# Patient Record
Sex: FEMALE | Race: WHITE | NOT HISPANIC OR LATINO
[De-identification: names, ages, dates, MRNs, and addresses within clinical notes are randomized per-mention and may not be internally consistent; named-entity substitution may affect disease eponyms.]

---

## 2018-03-19 ENCOUNTER — FORM ENCOUNTER (OUTPATIENT)
Age: 43
End: 2018-03-19

## 2019-03-01 ENCOUNTER — FORM ENCOUNTER (OUTPATIENT)
Age: 44
End: 2019-03-01

## 2019-05-23 ENCOUNTER — TRANSCRIPTION ENCOUNTER (OUTPATIENT)
Age: 44
End: 2019-05-23

## 2019-05-29 ENCOUNTER — FORM ENCOUNTER (OUTPATIENT)
Age: 44
End: 2019-05-29

## 2019-05-29 PROBLEM — Z00.00 ENCOUNTER FOR PREVENTIVE HEALTH EXAMINATION: Status: ACTIVE | Noted: 2019-05-29

## 2019-06-02 ENCOUNTER — FORM ENCOUNTER (OUTPATIENT)
Age: 44
End: 2019-06-02

## 2019-06-06 ENCOUNTER — FORM ENCOUNTER (OUTPATIENT)
Age: 44
End: 2019-06-06

## 2019-06-10 ENCOUNTER — APPOINTMENT (OUTPATIENT)
Dept: MRI IMAGING | Facility: CLINIC | Age: 44
End: 2019-06-10

## 2019-06-10 ENCOUNTER — APPOINTMENT (OUTPATIENT)
Dept: MRI IMAGING | Facility: CLINIC | Age: 44
End: 2019-06-10
Payer: COMMERCIAL

## 2019-06-10 ENCOUNTER — OUTPATIENT (OUTPATIENT)
Dept: OUTPATIENT SERVICES | Facility: HOSPITAL | Age: 44
LOS: 1 days | End: 2019-06-10

## 2019-06-10 PROCEDURE — 70543 MRI ORBT/FAC/NCK W/O &W/DYE: CPT | Mod: 26

## 2019-06-10 PROCEDURE — 70553 MRI BRAIN STEM W/O & W/DYE: CPT | Mod: 26

## 2019-06-11 ENCOUNTER — FORM ENCOUNTER (OUTPATIENT)
Age: 44
End: 2019-06-11

## 2019-06-26 ENCOUNTER — APPOINTMENT (OUTPATIENT)
Dept: OPHTHALMOLOGY | Facility: CLINIC | Age: 44
End: 2019-06-26
Payer: COMMERCIAL

## 2019-06-26 ENCOUNTER — NON-APPOINTMENT (OUTPATIENT)
Age: 44
End: 2019-06-26

## 2019-06-26 PROCEDURE — 92083 EXTENDED VISUAL FIELD XM: CPT

## 2019-06-26 PROCEDURE — 99204 OFFICE O/P NEW MOD 45 MIN: CPT

## 2019-06-27 ENCOUNTER — APPOINTMENT (OUTPATIENT)
Dept: HUMAN REPRODUCTION | Facility: CLINIC | Age: 44
End: 2019-06-27
Payer: COMMERCIAL

## 2019-06-27 PROCEDURE — 99205 OFFICE O/P NEW HI 60 MIN: CPT | Mod: 25

## 2019-06-27 PROCEDURE — 76817 TRANSVAGINAL US OBSTETRIC: CPT

## 2019-07-15 ENCOUNTER — OUTPATIENT (OUTPATIENT)
Dept: OUTPATIENT SERVICES | Facility: HOSPITAL | Age: 44
LOS: 1 days | End: 2019-07-15

## 2019-07-15 ENCOUNTER — APPOINTMENT (OUTPATIENT)
Dept: MRI IMAGING | Facility: CLINIC | Age: 44
End: 2019-07-15
Payer: COMMERCIAL

## 2019-07-15 PROCEDURE — 72156 MRI NECK SPINE W/O & W/DYE: CPT | Mod: 26

## 2019-08-20 ENCOUNTER — APPOINTMENT (OUTPATIENT)
Dept: HUMAN REPRODUCTION | Facility: CLINIC | Age: 44
End: 2019-08-20
Payer: COMMERCIAL

## 2019-08-20 PROCEDURE — 76830 TRANSVAGINAL US NON-OB: CPT

## 2019-08-20 PROCEDURE — 99213 OFFICE O/P EST LOW 20 MIN: CPT | Mod: 25

## 2019-08-20 PROCEDURE — 36415 COLL VENOUS BLD VENIPUNCTURE: CPT

## 2019-08-21 ENCOUNTER — TRANSCRIPTION ENCOUNTER (OUTPATIENT)
Age: 44
End: 2019-08-21

## 2019-08-28 ENCOUNTER — APPOINTMENT (OUTPATIENT)
Dept: GYNECOLOGIC ONCOLOGY | Facility: CLINIC | Age: 44
End: 2019-08-28
Payer: COMMERCIAL

## 2019-08-28 VITALS
HEIGHT: 66 IN | SYSTOLIC BLOOD PRESSURE: 116 MMHG | WEIGHT: 128 LBS | DIASTOLIC BLOOD PRESSURE: 72 MMHG | BODY MASS INDEX: 20.57 KG/M2

## 2019-08-28 DIAGNOSIS — Z83.3 FAMILY HISTORY OF DIABETES MELLITUS: ICD-10-CM

## 2019-08-28 DIAGNOSIS — Z82.49 FAMILY HISTORY OF ISCHEMIC HEART DISEASE AND OTHER DISEASES OF THE CIRCULATORY SYSTEM: ICD-10-CM

## 2019-08-28 DIAGNOSIS — Z86.39 PERSONAL HISTORY OF OTHER ENDOCRINE, NUTRITIONAL AND METABOLIC DISEASE: ICD-10-CM

## 2019-08-28 DIAGNOSIS — F19.90 OTHER PSYCHOACTIVE SUBSTANCE USE, UNSPECIFIED, UNCOMPLICATED: ICD-10-CM

## 2019-08-28 PROCEDURE — 99205 OFFICE O/P NEW HI 60 MIN: CPT

## 2019-08-28 RX ORDER — LEVOTHYROXINE SODIUM 137 UG/1
TABLET ORAL
Refills: 0 | Status: ACTIVE | COMMUNITY

## 2019-08-28 RX ORDER — PNV 24/IRON AA CHEL/FOLIC ACID 30 MG-975
30-0.975-2 TABLET ORAL
Refills: 0 | Status: ACTIVE | COMMUNITY

## 2019-08-28 NOTE — PHYSICAL EXAM
[Normal] : Recto-Vaginal Exam: Normal [Fully active, able to carry on all pre-disease performance without restriction] : Status 0 - Fully active, able to carry on all pre-disease performance without restriction [de-identified] : redundant skin noted posterior to the introitus. 2mm skin tag still present. Colposcopic evaluation of the vulva revelas no other abnormal lesions.

## 2019-08-28 NOTE — ASSESSMENT
[FreeTextEntry1] : I reviewed the pathology report in detail with the patient and her , with the aid of diagrams. \par Recommendations for the management of vulvar dysplasia reviewed in detail. Medical vs. surgical management can be considered. Medical management with topical imiquimod cream to the vulva for 12 weeks has been shown to result in regression of disease. Side effects of this treatment include pain and burning secondary to excoriation. Laser ablation of lesions is also an option. Surgical management would include wide local excision of the lesion. For higher grade lesions, wide local excision has the additional benefit of ruling out invasive disease. \par \par Complications of wide local excision include wound separation, bleeding, and infection. The need for re-excision in the case of positive margin also discussed. If untreated, the risk of progression to vulvar carcinoma explained. \par \par Patient to return Friday for local excision. \par \par Will discuss genetic counseling 2' to maternal hx of breast cancer age 40.

## 2019-08-28 NOTE — PAST MEDICAL HISTORY
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

## 2019-08-28 NOTE — HISTORY OF PRESENT ILLNESS
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

## 2019-08-28 NOTE — CHIEF COMPLAINT
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 \par \par Hx of HPV related abnormal PAP in her 20s managed with a LEEP, Paps since have been normal.\par \par OBHx: C/S x 1\par GYNHx: as above\par PMHx: Hypothyroid\par PSHx: rhiplasty, c/section, LEEP\par Meds: thyroxine\par Allergies: NDKA\par SocialHx:School psychologist, , ETOH 1x weekly, no other toxic habits\par FmHx: Breast cancer mother age 39.

## 2019-08-30 ENCOUNTER — APPOINTMENT (OUTPATIENT)
Dept: GYNECOLOGIC ONCOLOGY | Facility: CLINIC | Age: 44
End: 2019-08-30
Payer: COMMERCIAL

## 2019-08-30 PROCEDURE — 56606 BIOPSY OF VULVA/PERINEUM: CPT

## 2019-08-30 PROCEDURE — 56605 BIOPSY OF VULVA/PERINEUM: CPT

## 2019-08-30 PROCEDURE — 99213 OFFICE O/P EST LOW 20 MIN: CPT | Mod: 25

## 2019-08-30 NOTE — HISTORY OF PRESENT ILLNESS
[FreeTextEntry1] : Problem List\par 1) Perirectal Biopsy: HSIL (SHAIN 3) extending to one of the lateral margins of the specimen.

## 2019-08-30 NOTE — REASON FOR VISIT
[FreeTextEntry1] : The wide local excision procedure was discussed with the patient in detail. The risks, benefits, alternatives and indications for the procedure were reviewed in detail. The area was prepped with betadyne and injected with 10cc of 1% lidocaine. A 3mm raised lesion was appreciated at left aspect of the posterior perineum. The vulvar lesion was excised with the scalpel. An additional anterior and posterior margin was taken. The skin edges were reapproximated with 3-0 monocryl. Excellent hemostasis was appreciated at the conclusion of the procedure. The patient tolerated the procedure well. Post procedure instructions given.

## 2019-08-30 NOTE — ASSESSMENT
[FreeTextEntry1] : [] Follow up pathology result\par [] Return in 4 weeks\par [] Discuss genetic counseling at next visit.

## 2019-08-30 NOTE — PAST MEDICAL HISTORY
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

## 2019-08-30 NOTE — PHYSICAL EXAM
[Normal] : Recto-Vaginal Exam: Normal [Fully active, able to carry on all pre-disease performance without restriction] : Status 0 - Fully active, able to carry on all pre-disease performance without restriction [de-identified] : redundant skin noted posterior to the introitus. 2mm skin tag still present. Colposcopic evaluation of the vulva revelas no other abnormal lesions.

## 2019-09-19 ENCOUNTER — APPOINTMENT (OUTPATIENT)
Dept: HUMAN REPRODUCTION | Facility: CLINIC | Age: 44
End: 2019-09-19
Payer: COMMERCIAL

## 2019-09-19 PROCEDURE — 76830 TRANSVAGINAL US NON-OB: CPT

## 2019-09-19 PROCEDURE — 36415 COLL VENOUS BLD VENIPUNCTURE: CPT

## 2019-09-27 ENCOUNTER — APPOINTMENT (OUTPATIENT)
Dept: HUMAN REPRODUCTION | Facility: CLINIC | Age: 44
End: 2019-09-27
Payer: COMMERCIAL

## 2019-09-27 ENCOUNTER — APPOINTMENT (OUTPATIENT)
Dept: GYNECOLOGIC ONCOLOGY | Facility: CLINIC | Age: 44
End: 2019-09-27
Payer: COMMERCIAL

## 2019-09-27 PROCEDURE — 76830 TRANSVAGINAL US NON-OB: CPT

## 2019-09-27 PROCEDURE — 99213 OFFICE O/P EST LOW 20 MIN: CPT | Mod: 25

## 2019-09-27 PROCEDURE — 36415 COLL VENOUS BLD VENIPUNCTURE: CPT

## 2019-09-30 ENCOUNTER — APPOINTMENT (OUTPATIENT)
Dept: HUMAN REPRODUCTION | Facility: CLINIC | Age: 44
End: 2019-09-30
Payer: COMMERCIAL

## 2019-09-30 PROCEDURE — 99213 OFFICE O/P EST LOW 20 MIN: CPT | Mod: 25

## 2019-09-30 PROCEDURE — 76830 TRANSVAGINAL US NON-OB: CPT

## 2019-09-30 PROCEDURE — 36415 COLL VENOUS BLD VENIPUNCTURE: CPT

## 2019-10-01 ENCOUNTER — APPOINTMENT (OUTPATIENT)
Dept: HUMAN REPRODUCTION | Facility: CLINIC | Age: 44
End: 2019-10-01

## 2019-10-01 ENCOUNTER — APPOINTMENT (OUTPATIENT)
Dept: HUMAN REPRODUCTION | Facility: CLINIC | Age: 44
End: 2019-10-01
Payer: COMMERCIAL

## 2019-10-01 PROCEDURE — 58322 ARTIFICIAL INSEMINATION: CPT

## 2019-10-01 PROCEDURE — 99213 OFFICE O/P EST LOW 20 MIN: CPT | Mod: 25

## 2019-10-01 PROCEDURE — 76830 TRANSVAGINAL US NON-OB: CPT

## 2019-10-14 ENCOUNTER — APPOINTMENT (OUTPATIENT)
Dept: HUMAN REPRODUCTION | Facility: CLINIC | Age: 44
End: 2019-10-14
Payer: COMMERCIAL

## 2019-10-14 PROCEDURE — 36415 COLL VENOUS BLD VENIPUNCTURE: CPT

## 2019-10-18 ENCOUNTER — APPOINTMENT (OUTPATIENT)
Dept: HUMAN REPRODUCTION | Facility: CLINIC | Age: 44
End: 2019-10-18
Payer: COMMERCIAL

## 2019-10-18 ENCOUNTER — APPOINTMENT (OUTPATIENT)
Dept: GYNECOLOGIC ONCOLOGY | Facility: CLINIC | Age: 44
End: 2019-10-18
Payer: COMMERCIAL

## 2019-10-18 VITALS
SYSTOLIC BLOOD PRESSURE: 118 MMHG | WEIGHT: 130 LBS | DIASTOLIC BLOOD PRESSURE: 64 MMHG | BODY MASS INDEX: 20.89 KG/M2 | HEIGHT: 66 IN

## 2019-10-18 PROCEDURE — 99213 OFFICE O/P EST LOW 20 MIN: CPT

## 2019-10-18 PROCEDURE — 36415 COLL VENOUS BLD VENIPUNCTURE: CPT

## 2019-10-18 PROCEDURE — 99213 OFFICE O/P EST LOW 20 MIN: CPT | Mod: 25

## 2019-10-18 PROCEDURE — 76830 TRANSVAGINAL US NON-OB: CPT

## 2019-10-18 NOTE — ASSESSMENT
[FreeTextEntry1] : Excision site well healed. No evidence of dysplasia on specimen.\par \par Importance of close follow up emphasized. Location of dysplasia also discussed as area is very close to the rectum and difficult to assess. \par \par Reconsult as needed.

## 2019-10-18 NOTE — HISTORY OF PRESENT ILLNESS
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 \par 2) Wide excision biopsy of vulvar : 8/30/2019 \par     a] vulvar lesion: polypoid fragment of squamous mucosa w/ hyperkeratosis\par     b] Posterior margin: Unremarkable squamous mucosa negative for dysplasia\par     c] Anterior margin: Unremarkable squamous mucosa negative for dysplasia

## 2019-10-18 NOTE — REASON FOR VISIT
[FreeTextEntry1] : Pt here for 4 week f/u of wide local excision for SHANI III. \par \par Discussed family hx of breast cancer in more detail today. Patient reports she had genetic testing and it was negative. \par \par OBHx: C/S x 1\par GYNHx: as above\par PMHx: Hypothyroid\par PSHx: rhiplasty, c/section, LEEP\par Meds: thyroxine\par Allergies: NDKA\par SocialHx:School psychologist, , ETOH 1x weekly, no other toxic habits\par FmHx: Breast cancer mother age 39.

## 2019-10-18 NOTE — PAST MEDICAL HISTORY
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  [FreeTextEntry3] : duration  4-6 days

## 2019-10-25 ENCOUNTER — APPOINTMENT (OUTPATIENT)
Dept: HUMAN REPRODUCTION | Facility: CLINIC | Age: 44
End: 2019-10-25
Payer: COMMERCIAL

## 2019-10-25 PROCEDURE — 99213 OFFICE O/P EST LOW 20 MIN: CPT | Mod: 25

## 2019-10-25 PROCEDURE — 36415 COLL VENOUS BLD VENIPUNCTURE: CPT

## 2019-10-25 PROCEDURE — 76830 TRANSVAGINAL US NON-OB: CPT

## 2019-10-28 ENCOUNTER — APPOINTMENT (OUTPATIENT)
Dept: HUMAN REPRODUCTION | Facility: CLINIC | Age: 44
End: 2019-10-28
Payer: COMMERCIAL

## 2019-10-28 PROCEDURE — 76830 TRANSVAGINAL US NON-OB: CPT

## 2019-10-28 PROCEDURE — 99213 OFFICE O/P EST LOW 20 MIN: CPT | Mod: 25

## 2019-10-30 ENCOUNTER — APPOINTMENT (OUTPATIENT)
Dept: HUMAN REPRODUCTION | Facility: CLINIC | Age: 44
End: 2019-10-30
Payer: COMMERCIAL

## 2019-10-30 PROCEDURE — 99213 OFFICE O/P EST LOW 20 MIN: CPT | Mod: 25

## 2019-10-30 PROCEDURE — 58322 ARTIFICIAL INSEMINATION: CPT

## 2019-10-30 PROCEDURE — 76830 TRANSVAGINAL US NON-OB: CPT

## 2019-10-30 PROCEDURE — 89261 SPERM ISOLATION COMPLEX: CPT

## 2019-11-11 ENCOUNTER — APPOINTMENT (OUTPATIENT)
Dept: HUMAN REPRODUCTION | Facility: CLINIC | Age: 44
End: 2019-11-11
Payer: COMMERCIAL

## 2019-11-11 PROCEDURE — 36415 COLL VENOUS BLD VENIPUNCTURE: CPT

## 2019-11-13 ENCOUNTER — APPOINTMENT (OUTPATIENT)
Dept: HUMAN REPRODUCTION | Facility: CLINIC | Age: 44
End: 2019-11-13
Payer: COMMERCIAL

## 2019-11-13 PROCEDURE — 36415 COLL VENOUS BLD VENIPUNCTURE: CPT

## 2019-11-21 ENCOUNTER — APPOINTMENT (OUTPATIENT)
Dept: HUMAN REPRODUCTION | Facility: CLINIC | Age: 44
End: 2019-11-21
Payer: COMMERCIAL

## 2019-11-21 PROCEDURE — 76817 TRANSVAGINAL US OBSTETRIC: CPT

## 2019-11-21 PROCEDURE — 99213 OFFICE O/P EST LOW 20 MIN: CPT | Mod: 25

## 2019-11-27 ENCOUNTER — APPOINTMENT (OUTPATIENT)
Dept: HUMAN REPRODUCTION | Facility: CLINIC | Age: 44
End: 2019-11-27
Payer: COMMERCIAL

## 2019-11-27 PROCEDURE — 99213 OFFICE O/P EST LOW 20 MIN: CPT | Mod: 25

## 2019-11-27 PROCEDURE — 76817 TRANSVAGINAL US OBSTETRIC: CPT

## 2019-11-27 PROCEDURE — 36415 COLL VENOUS BLD VENIPUNCTURE: CPT

## 2019-12-04 ENCOUNTER — APPOINTMENT (OUTPATIENT)
Dept: HUMAN REPRODUCTION | Facility: CLINIC | Age: 44
End: 2019-12-04
Payer: COMMERCIAL

## 2019-12-04 PROCEDURE — 36415 COLL VENOUS BLD VENIPUNCTURE: CPT

## 2019-12-04 PROCEDURE — 99213 OFFICE O/P EST LOW 20 MIN: CPT | Mod: 25

## 2019-12-04 PROCEDURE — 76817 TRANSVAGINAL US OBSTETRIC: CPT

## 2019-12-06 ENCOUNTER — APPOINTMENT (OUTPATIENT)
Dept: HUMAN REPRODUCTION | Facility: CLINIC | Age: 44
End: 2019-12-06
Payer: COMMERCIAL

## 2019-12-06 ENCOUNTER — APPOINTMENT (OUTPATIENT)
Dept: ANTEPARTUM | Facility: CLINIC | Age: 44
End: 2019-12-06
Payer: COMMERCIAL

## 2019-12-06 PROCEDURE — 76817 TRANSVAGINAL US OBSTETRIC: CPT

## 2019-12-06 PROCEDURE — 99213 OFFICE O/P EST LOW 20 MIN: CPT | Mod: 25

## 2019-12-06 PROCEDURE — 76830 TRANSVAGINAL US NON-OB: CPT

## 2019-12-09 ENCOUNTER — APPOINTMENT (OUTPATIENT)
Dept: HUMAN REPRODUCTION | Facility: CLINIC | Age: 44
End: 2019-12-09
Payer: COMMERCIAL

## 2019-12-09 ENCOUNTER — APPOINTMENT (OUTPATIENT)
Dept: HUMAN REPRODUCTION | Facility: CLINIC | Age: 44
End: 2019-12-09

## 2019-12-09 PROCEDURE — 76817 TRANSVAGINAL US OBSTETRIC: CPT

## 2019-12-09 PROCEDURE — 99214 OFFICE O/P EST MOD 30 MIN: CPT | Mod: 25

## 2019-12-11 ENCOUNTER — APPOINTMENT (OUTPATIENT)
Dept: HUMAN REPRODUCTION | Facility: CLINIC | Age: 44
End: 2019-12-11

## 2019-12-17 ENCOUNTER — RESULT REVIEW (OUTPATIENT)
Age: 44
End: 2019-12-17

## 2019-12-17 ENCOUNTER — APPOINTMENT (OUTPATIENT)
Dept: OBGYN | Facility: AMBULATORY SURGERY CENTER | Age: 44
End: 2019-12-17

## 2019-12-17 ENCOUNTER — APPOINTMENT (OUTPATIENT)
Dept: HUMAN REPRODUCTION | Facility: CLINIC | Age: 44
End: 2019-12-17

## 2019-12-17 ENCOUNTER — APPOINTMENT (OUTPATIENT)
Dept: HUMAN REPRODUCTION | Facility: CLINIC | Age: 44
End: 2019-12-17
Payer: COMMERCIAL

## 2019-12-17 PROCEDURE — 99213 OFFICE O/P EST LOW 20 MIN: CPT | Mod: 25

## 2019-12-17 PROCEDURE — 76817 TRANSVAGINAL US OBSTETRIC: CPT

## 2019-12-17 PROCEDURE — 59820 CARE OF MISCARRIAGE: CPT

## 2019-12-17 PROCEDURE — 76998 US GUIDE INTRAOP: CPT | Mod: 59

## 2019-12-17 PROCEDURE — 88305 TISSUE EXAM BY PATHOLOGIST: CPT | Mod: 26

## 2019-12-18 ENCOUNTER — OUTPATIENT (OUTPATIENT)
Dept: OUTPATIENT SERVICES | Facility: HOSPITAL | Age: 44
LOS: 1 days | End: 2019-12-18
Payer: COMMERCIAL

## 2019-12-18 DIAGNOSIS — O02.1 MISSED ABORTION: ICD-10-CM

## 2019-12-18 PROCEDURE — 88305 TISSUE EXAM BY PATHOLOGIST: CPT

## 2019-12-20 LAB — SURGICAL PATHOLOGY STUDY: SIGNIFICANT CHANGE UP

## 2020-01-21 LAB — CHROM ANALY OVERALL INTERP SPEC-IMP: SIGNIFICANT CHANGE UP

## 2020-01-31 ENCOUNTER — APPOINTMENT (OUTPATIENT)
Dept: HUMAN REPRODUCTION | Facility: CLINIC | Age: 45
End: 2020-01-31
Payer: COMMERCIAL

## 2020-01-31 PROCEDURE — 36415 COLL VENOUS BLD VENIPUNCTURE: CPT

## 2020-02-11 ENCOUNTER — APPOINTMENT (OUTPATIENT)
Dept: HUMAN REPRODUCTION | Facility: CLINIC | Age: 45
End: 2020-02-11
Payer: COMMERCIAL

## 2020-02-11 PROCEDURE — 36415 COLL VENOUS BLD VENIPUNCTURE: CPT

## 2020-02-11 PROCEDURE — 76830 TRANSVAGINAL US NON-OB: CPT

## 2020-02-13 ENCOUNTER — FORM ENCOUNTER (OUTPATIENT)
Age: 45
End: 2020-02-13

## 2020-02-14 ENCOUNTER — APPOINTMENT (OUTPATIENT)
Dept: HUMAN REPRODUCTION | Facility: CLINIC | Age: 45
End: 2020-02-14
Payer: COMMERCIAL

## 2020-02-14 PROCEDURE — 36415 COLL VENOUS BLD VENIPUNCTURE: CPT

## 2020-03-02 ENCOUNTER — FORM ENCOUNTER (OUTPATIENT)
Age: 45
End: 2020-03-02

## 2020-03-12 ENCOUNTER — APPOINTMENT (OUTPATIENT)
Dept: HUMAN REPRODUCTION | Facility: CLINIC | Age: 45
End: 2020-03-12
Payer: COMMERCIAL

## 2020-03-12 PROCEDURE — 36415 COLL VENOUS BLD VENIPUNCTURE: CPT

## 2020-03-12 PROCEDURE — 99213 OFFICE O/P EST LOW 20 MIN: CPT | Mod: 25

## 2020-03-12 PROCEDURE — 0025H: CPT | Mod: NC

## 2020-03-12 PROCEDURE — 99212 OFFICE O/P EST SF 10 MIN: CPT | Mod: 24

## 2020-03-12 PROCEDURE — 76830 TRANSVAGINAL US NON-OB: CPT

## 2020-03-16 ENCOUNTER — APPOINTMENT (OUTPATIENT)
Dept: HUMAN REPRODUCTION | Facility: CLINIC | Age: 45
End: 2020-03-16

## 2020-05-21 ENCOUNTER — APPOINTMENT (OUTPATIENT)
Dept: HUMAN REPRODUCTION | Facility: CLINIC | Age: 45
End: 2020-05-21
Payer: COMMERCIAL

## 2020-05-21 PROCEDURE — 36415 COLL VENOUS BLD VENIPUNCTURE: CPT

## 2020-05-21 PROCEDURE — 99213 OFFICE O/P EST LOW 20 MIN: CPT | Mod: 25

## 2020-05-21 PROCEDURE — 76830 TRANSVAGINAL US NON-OB: CPT

## 2020-05-26 ENCOUNTER — APPOINTMENT (OUTPATIENT)
Dept: HUMAN REPRODUCTION | Facility: CLINIC | Age: 45
End: 2020-05-26
Payer: COMMERCIAL

## 2020-05-26 PROCEDURE — 36415 COLL VENOUS BLD VENIPUNCTURE: CPT

## 2020-05-26 PROCEDURE — 99213 OFFICE O/P EST LOW 20 MIN: CPT | Mod: 25

## 2020-05-26 PROCEDURE — 76830 TRANSVAGINAL US NON-OB: CPT

## 2020-05-28 ENCOUNTER — APPOINTMENT (OUTPATIENT)
Dept: HUMAN REPRODUCTION | Facility: CLINIC | Age: 45
End: 2020-05-28
Payer: COMMERCIAL

## 2020-05-28 PROCEDURE — 76830 TRANSVAGINAL US NON-OB: CPT

## 2020-05-28 PROCEDURE — 99213 OFFICE O/P EST LOW 20 MIN: CPT | Mod: 25

## 2020-05-28 PROCEDURE — 36415 COLL VENOUS BLD VENIPUNCTURE: CPT

## 2020-05-29 ENCOUNTER — APPOINTMENT (OUTPATIENT)
Dept: HUMAN REPRODUCTION | Facility: CLINIC | Age: 45
End: 2020-05-29
Payer: COMMERCIAL

## 2020-05-29 PROCEDURE — 36415 COLL VENOUS BLD VENIPUNCTURE: CPT

## 2020-05-29 PROCEDURE — 76830 TRANSVAGINAL US NON-OB: CPT

## 2020-05-29 PROCEDURE — 99213 OFFICE O/P EST LOW 20 MIN: CPT | Mod: 25

## 2020-06-01 ENCOUNTER — APPOINTMENT (OUTPATIENT)
Dept: HUMAN REPRODUCTION | Facility: CLINIC | Age: 45
End: 2020-06-01
Payer: COMMERCIAL

## 2020-06-01 PROCEDURE — 36415 COLL VENOUS BLD VENIPUNCTURE: CPT

## 2020-06-02 ENCOUNTER — APPOINTMENT (OUTPATIENT)
Dept: HUMAN REPRODUCTION | Facility: CLINIC | Age: 45
End: 2020-06-02
Payer: COMMERCIAL

## 2020-06-02 PROCEDURE — 89280 ASSIST OOCYTE FERTILIZATION: CPT

## 2020-06-02 PROCEDURE — 76948 ECHO GUIDE OVA ASPIRATION: CPT

## 2020-06-02 PROCEDURE — 89250 CULTR OOCYTE/EMBRYO <4 DAYS: CPT

## 2020-06-02 PROCEDURE — 89261 SPERM ISOLATION COMPLEX: CPT

## 2020-06-02 PROCEDURE — 89254 OOCYTE IDENTIFICATION: CPT

## 2020-06-02 PROCEDURE — 89268 INSEMINATION OF OOCYTES: CPT

## 2020-06-02 PROCEDURE — 58970 RETRIEVAL OF OOCYTE: CPT

## 2020-06-05 PROCEDURE — 89253 EMBRYO HATCHING: CPT

## 2020-06-07 PROCEDURE — 89272 EXTENDED CULTURE OF OOCYTES: CPT

## 2020-06-09 PROCEDURE — 89291 BIOPSY OOCYTE POLAR BODY: CPT

## 2020-06-09 PROCEDURE — 89258 CRYOPRESERVATION EMBRYO(S): CPT

## 2020-06-09 PROCEDURE — 99291 CRITICAL CARE FIRST HOUR: CPT

## 2020-06-09 PROCEDURE — 89342 STORAGE/YEAR EMBRYO(S): CPT

## 2020-06-15 ENCOUNTER — APPOINTMENT (OUTPATIENT)
Dept: HUMAN REPRODUCTION | Facility: CLINIC | Age: 45
End: 2020-06-15

## 2020-06-15 ENCOUNTER — APPOINTMENT (OUTPATIENT)
Dept: HUMAN REPRODUCTION | Facility: CLINIC | Age: 45
End: 2020-06-15
Payer: COMMERCIAL

## 2020-06-15 PROCEDURE — 76830 TRANSVAGINAL US NON-OB: CPT

## 2020-06-15 PROCEDURE — 99213 OFFICE O/P EST LOW 20 MIN: CPT | Mod: 25

## 2020-06-15 PROCEDURE — 0026H: CPT | Mod: NC

## 2020-06-15 PROCEDURE — 36415 COLL VENOUS BLD VENIPUNCTURE: CPT

## 2020-06-24 ENCOUNTER — APPOINTMENT (OUTPATIENT)
Dept: HUMAN REPRODUCTION | Facility: CLINIC | Age: 45
End: 2020-06-24

## 2020-07-01 ENCOUNTER — APPOINTMENT (OUTPATIENT)
Dept: HUMAN REPRODUCTION | Facility: CLINIC | Age: 45
End: 2020-07-01
Payer: COMMERCIAL

## 2020-07-01 PROCEDURE — 36415 COLL VENOUS BLD VENIPUNCTURE: CPT

## 2020-07-01 PROCEDURE — 99213 OFFICE O/P EST LOW 20 MIN: CPT | Mod: 25

## 2020-07-01 PROCEDURE — 76830 TRANSVAGINAL US NON-OB: CPT

## 2020-07-06 ENCOUNTER — APPOINTMENT (OUTPATIENT)
Dept: HUMAN REPRODUCTION | Facility: CLINIC | Age: 45
End: 2020-07-06
Payer: COMMERCIAL

## 2020-07-06 PROCEDURE — 76830 TRANSVAGINAL US NON-OB: CPT

## 2020-07-06 PROCEDURE — 99213 OFFICE O/P EST LOW 20 MIN: CPT | Mod: 25

## 2020-07-06 PROCEDURE — 36415 COLL VENOUS BLD VENIPUNCTURE: CPT

## 2020-07-08 ENCOUNTER — APPOINTMENT (OUTPATIENT)
Dept: HUMAN REPRODUCTION | Facility: CLINIC | Age: 45
End: 2020-07-08
Payer: COMMERCIAL

## 2020-07-08 PROCEDURE — 76830 TRANSVAGINAL US NON-OB: CPT

## 2020-07-08 PROCEDURE — 36415 COLL VENOUS BLD VENIPUNCTURE: CPT

## 2020-07-08 PROCEDURE — 99213 OFFICE O/P EST LOW 20 MIN: CPT | Mod: 25

## 2020-07-10 ENCOUNTER — APPOINTMENT (OUTPATIENT)
Dept: HUMAN REPRODUCTION | Facility: CLINIC | Age: 45
End: 2020-07-10
Payer: COMMERCIAL

## 2020-07-10 PROCEDURE — 76830 TRANSVAGINAL US NON-OB: CPT

## 2020-07-10 PROCEDURE — 99213 OFFICE O/P EST LOW 20 MIN: CPT | Mod: 25

## 2020-07-10 PROCEDURE — 36415 COLL VENOUS BLD VENIPUNCTURE: CPT

## 2020-07-13 ENCOUNTER — APPOINTMENT (OUTPATIENT)
Dept: HUMAN REPRODUCTION | Facility: CLINIC | Age: 45
End: 2020-07-13
Payer: COMMERCIAL

## 2020-07-13 PROCEDURE — 76948 ECHO GUIDE OVA ASPIRATION: CPT

## 2020-07-13 PROCEDURE — 89281 ASSIST OOCYTE FERTILIZATION: CPT

## 2020-07-13 PROCEDURE — 89261 SPERM ISOLATION COMPLEX: CPT

## 2020-07-13 PROCEDURE — 89254 OOCYTE IDENTIFICATION: CPT

## 2020-07-13 PROCEDURE — 89250 CULTR OOCYTE/EMBRYO <4 DAYS: CPT

## 2020-07-13 PROCEDURE — 58970 RETRIEVAL OF OOCYTE: CPT

## 2020-07-16 PROCEDURE — 89253 EMBRYO HATCHING: CPT

## 2020-07-18 PROCEDURE — 89272 EXTENDED CULTURE OF OOCYTES: CPT

## 2020-07-19 PROCEDURE — 89258 CRYOPRESERVATION EMBRYO(S): CPT

## 2020-07-19 PROCEDURE — 89290 BIOPSY OOCYTE POLAR BODY <=5: CPT

## 2020-07-19 PROCEDURE — 89342 STORAGE/YEAR EMBRYO(S): CPT | Mod: NC

## 2020-07-19 PROCEDURE — 89291 BIOPSY OOCYTE POLAR BODY: CPT

## 2020-07-25 ENCOUNTER — APPOINTMENT (OUTPATIENT)
Dept: HUMAN REPRODUCTION | Facility: CLINIC | Age: 45
End: 2020-07-25

## 2020-07-27 ENCOUNTER — APPOINTMENT (OUTPATIENT)
Dept: HUMAN REPRODUCTION | Facility: CLINIC | Age: 45
End: 2020-07-27
Payer: COMMERCIAL

## 2020-07-27 PROCEDURE — 99213 OFFICE O/P EST LOW 20 MIN: CPT | Mod: 95

## 2020-08-17 ENCOUNTER — APPOINTMENT (OUTPATIENT)
Dept: HUMAN REPRODUCTION | Facility: CLINIC | Age: 45
End: 2020-08-17
Payer: COMMERCIAL

## 2020-08-17 PROCEDURE — 76830 TRANSVAGINAL US NON-OB: CPT

## 2020-08-17 PROCEDURE — 36415 COLL VENOUS BLD VENIPUNCTURE: CPT

## 2020-08-17 PROCEDURE — 99213 OFFICE O/P EST LOW 20 MIN: CPT | Mod: 25

## 2020-08-20 ENCOUNTER — APPOINTMENT (OUTPATIENT)
Dept: HUMAN REPRODUCTION | Facility: CLINIC | Age: 45
End: 2020-08-20
Payer: COMMERCIAL

## 2020-08-20 PROCEDURE — 36415 COLL VENOUS BLD VENIPUNCTURE: CPT

## 2020-08-20 PROCEDURE — 99213 OFFICE O/P EST LOW 20 MIN: CPT | Mod: 25

## 2020-08-20 PROCEDURE — 76830 TRANSVAGINAL US NON-OB: CPT

## 2020-08-24 ENCOUNTER — APPOINTMENT (OUTPATIENT)
Dept: HUMAN REPRODUCTION | Facility: CLINIC | Age: 45
End: 2020-08-24
Payer: COMMERCIAL

## 2020-08-24 PROCEDURE — 36415 COLL VENOUS BLD VENIPUNCTURE: CPT

## 2020-08-24 PROCEDURE — 99213 OFFICE O/P EST LOW 20 MIN: CPT | Mod: 25

## 2020-08-24 PROCEDURE — 76830 TRANSVAGINAL US NON-OB: CPT

## 2020-08-27 ENCOUNTER — APPOINTMENT (OUTPATIENT)
Dept: HUMAN REPRODUCTION | Facility: CLINIC | Age: 45
End: 2020-08-27
Payer: COMMERCIAL

## 2020-08-27 PROCEDURE — 36415 COLL VENOUS BLD VENIPUNCTURE: CPT

## 2020-08-27 PROCEDURE — 99213 OFFICE O/P EST LOW 20 MIN: CPT | Mod: 25

## 2020-08-27 PROCEDURE — 76830 TRANSVAGINAL US NON-OB: CPT

## 2020-08-31 ENCOUNTER — APPOINTMENT (OUTPATIENT)
Dept: HUMAN REPRODUCTION | Facility: CLINIC | Age: 45
End: 2020-08-31
Payer: COMMERCIAL

## 2020-08-31 PROCEDURE — 36415 COLL VENOUS BLD VENIPUNCTURE: CPT

## 2020-09-02 ENCOUNTER — APPOINTMENT (OUTPATIENT)
Dept: HUMAN REPRODUCTION | Facility: CLINIC | Age: 45
End: 2020-09-02
Payer: COMMERCIAL

## 2020-09-02 PROCEDURE — 89255 PREPARE EMBRYO FOR TRANSFER: CPT

## 2020-09-02 PROCEDURE — 76705 ECHO EXAM OF ABDOMEN: CPT

## 2020-09-02 PROCEDURE — 58974 EMBRYO TRANSFER INTRAUTERINE: CPT

## 2020-09-02 PROCEDURE — 89352 THAWING CRYOPRESRVED EMBRYO: CPT

## 2020-09-11 ENCOUNTER — APPOINTMENT (OUTPATIENT)
Dept: HUMAN REPRODUCTION | Facility: CLINIC | Age: 45
End: 2020-09-11
Payer: COMMERCIAL

## 2020-09-11 PROCEDURE — 36415 COLL VENOUS BLD VENIPUNCTURE: CPT

## 2020-09-24 ENCOUNTER — APPOINTMENT (OUTPATIENT)
Dept: HUMAN REPRODUCTION | Facility: CLINIC | Age: 45
End: 2020-09-24
Payer: COMMERCIAL

## 2020-09-24 PROCEDURE — 99215 OFFICE O/P EST HI 40 MIN: CPT | Mod: 95

## 2020-09-29 ENCOUNTER — APPOINTMENT (OUTPATIENT)
Dept: HUMAN REPRODUCTION | Facility: CLINIC | Age: 45
End: 2020-09-29

## 2020-09-29 ENCOUNTER — APPOINTMENT (OUTPATIENT)
Dept: HUMAN REPRODUCTION | Facility: CLINIC | Age: 45
End: 2020-09-29
Payer: COMMERCIAL

## 2020-09-29 PROCEDURE — 36415 COLL VENOUS BLD VENIPUNCTURE: CPT

## 2020-09-30 ENCOUNTER — TRANSCRIPTION ENCOUNTER (OUTPATIENT)
Age: 45
End: 2020-09-30

## 2020-10-01 ENCOUNTER — OUTPATIENT (OUTPATIENT)
Dept: OUTPATIENT SERVICES | Facility: HOSPITAL | Age: 45
LOS: 1 days | Discharge: HOME | End: 2020-10-01

## 2020-10-01 ENCOUNTER — APPOINTMENT (OUTPATIENT)
Dept: OBGYN | Facility: AMBULATORY SURGERY CENTER | Age: 45
End: 2020-10-01
Payer: COMMERCIAL

## 2020-10-01 PROCEDURE — 58559 HYSTEROSCOPY LYSIS: CPT

## 2020-10-16 ENCOUNTER — APPOINTMENT (OUTPATIENT)
Dept: HUMAN REPRODUCTION | Facility: CLINIC | Age: 45
End: 2020-10-16
Payer: COMMERCIAL

## 2020-10-16 PROCEDURE — 36415 COLL VENOUS BLD VENIPUNCTURE: CPT

## 2020-10-16 PROCEDURE — 99213 OFFICE O/P EST LOW 20 MIN: CPT | Mod: 25

## 2020-10-16 PROCEDURE — 76830 TRANSVAGINAL US NON-OB: CPT

## 2020-10-21 ENCOUNTER — APPOINTMENT (OUTPATIENT)
Dept: HUMAN REPRODUCTION | Facility: CLINIC | Age: 45
End: 2020-10-21
Payer: COMMERCIAL

## 2020-10-21 PROCEDURE — 99213 OFFICE O/P EST LOW 20 MIN: CPT | Mod: 25

## 2020-10-21 PROCEDURE — 76830 TRANSVAGINAL US NON-OB: CPT

## 2020-10-21 PROCEDURE — 36415 COLL VENOUS BLD VENIPUNCTURE: CPT

## 2020-10-21 PROCEDURE — 99072 ADDL SUPL MATRL&STAF TM PHE: CPT

## 2020-10-27 ENCOUNTER — APPOINTMENT (OUTPATIENT)
Dept: HUMAN REPRODUCTION | Facility: CLINIC | Age: 45
End: 2020-10-27
Payer: COMMERCIAL

## 2020-10-27 PROCEDURE — 99214 OFFICE O/P EST MOD 30 MIN: CPT | Mod: 95

## 2020-10-28 ENCOUNTER — APPOINTMENT (OUTPATIENT)
Dept: HUMAN REPRODUCTION | Facility: CLINIC | Age: 45
End: 2020-10-28

## 2020-10-31 ENCOUNTER — APPOINTMENT (OUTPATIENT)
Dept: HUMAN REPRODUCTION | Facility: CLINIC | Age: 45
End: 2020-10-31
Payer: COMMERCIAL

## 2020-10-31 PROCEDURE — 99213 OFFICE O/P EST LOW 20 MIN: CPT | Mod: 25

## 2020-10-31 PROCEDURE — 99212 OFFICE O/P EST SF 10 MIN: CPT | Mod: 25

## 2020-10-31 PROCEDURE — 36415 COLL VENOUS BLD VENIPUNCTURE: CPT

## 2020-10-31 PROCEDURE — 76830 TRANSVAGINAL US NON-OB: CPT

## 2020-10-31 PROCEDURE — 99072 ADDL SUPL MATRL&STAF TM PHE: CPT

## 2020-11-04 ENCOUNTER — APPOINTMENT (OUTPATIENT)
Dept: HUMAN REPRODUCTION | Facility: CLINIC | Age: 45
End: 2020-11-04
Payer: COMMERCIAL

## 2020-11-04 PROCEDURE — 99072 ADDL SUPL MATRL&STAF TM PHE: CPT

## 2020-11-04 PROCEDURE — 36415 COLL VENOUS BLD VENIPUNCTURE: CPT

## 2020-11-05 ENCOUNTER — APPOINTMENT (OUTPATIENT)
Dept: HUMAN REPRODUCTION | Facility: CLINIC | Age: 45
End: 2020-11-05
Payer: COMMERCIAL

## 2020-11-05 PROCEDURE — 99213 OFFICE O/P EST LOW 20 MIN: CPT

## 2020-11-05 PROCEDURE — 76830 TRANSVAGINAL US NON-OB: CPT

## 2020-11-05 PROCEDURE — 36415 COLL VENOUS BLD VENIPUNCTURE: CPT

## 2020-11-05 PROCEDURE — 99072 ADDL SUPL MATRL&STAF TM PHE: CPT

## 2020-11-07 ENCOUNTER — APPOINTMENT (OUTPATIENT)
Dept: HUMAN REPRODUCTION | Facility: CLINIC | Age: 45
End: 2020-11-07
Payer: COMMERCIAL

## 2020-11-07 PROCEDURE — 99072 ADDL SUPL MATRL&STAF TM PHE: CPT

## 2020-11-07 PROCEDURE — 76830 TRANSVAGINAL US NON-OB: CPT

## 2020-11-07 PROCEDURE — 99213 OFFICE O/P EST LOW 20 MIN: CPT | Mod: 25

## 2020-11-07 PROCEDURE — 36415 COLL VENOUS BLD VENIPUNCTURE: CPT

## 2020-11-09 ENCOUNTER — APPOINTMENT (OUTPATIENT)
Dept: HUMAN REPRODUCTION | Facility: CLINIC | Age: 45
End: 2020-11-09
Payer: COMMERCIAL

## 2020-11-09 PROCEDURE — 99072 ADDL SUPL MATRL&STAF TM PHE: CPT

## 2020-11-09 PROCEDURE — 36415 COLL VENOUS BLD VENIPUNCTURE: CPT

## 2020-11-10 ENCOUNTER — APPOINTMENT (OUTPATIENT)
Dept: HUMAN REPRODUCTION | Facility: CLINIC | Age: 45
End: 2020-11-10
Payer: COMMERCIAL

## 2020-11-10 PROCEDURE — 89281 ASSIST OOCYTE FERTILIZATION: CPT

## 2020-11-10 PROCEDURE — 99072 ADDL SUPL MATRL&STAF TM PHE: CPT

## 2020-11-10 PROCEDURE — 89250 CULTR OOCYTE/EMBRYO <4 DAYS: CPT

## 2020-11-10 PROCEDURE — 58970 RETRIEVAL OF OOCYTE: CPT

## 2020-11-10 PROCEDURE — 89261 SPERM ISOLATION COMPLEX: CPT

## 2020-11-10 PROCEDURE — 89254 OOCYTE IDENTIFICATION: CPT

## 2020-11-10 PROCEDURE — 76948 ECHO GUIDE OVA ASPIRATION: CPT

## 2020-11-14 PROCEDURE — 99072 ADDL SUPL MATRL&STAF TM PHE: CPT

## 2020-11-14 PROCEDURE — 89253 EMBRYO HATCHING: CPT

## 2020-11-15 ENCOUNTER — APPOINTMENT (OUTPATIENT)
Dept: HUMAN REPRODUCTION | Facility: CLINIC | Age: 45
End: 2020-11-15
Payer: COMMERCIAL

## 2020-11-15 PROCEDURE — 89272 EXTENDED CULTURE OF OOCYTES: CPT

## 2020-11-15 PROCEDURE — 89352 THAWING CRYOPRESRVED EMBRYO: CPT

## 2020-11-15 PROCEDURE — 89255 PREPARE EMBRYO FOR TRANSFER: CPT

## 2020-11-15 PROCEDURE — 89291 BIOPSY OOCYTE POLAR BODY: CPT

## 2020-11-15 PROCEDURE — 76705 ECHO EXAM OF ABDOMEN: CPT

## 2020-11-15 PROCEDURE — 89258 CRYOPRESERVATION EMBRYO(S): CPT

## 2020-11-15 PROCEDURE — 99072 ADDL SUPL MATRL&STAF TM PHE: CPT

## 2020-11-15 PROCEDURE — 58974 EMBRYO TRANSFER INTRAUTERINE: CPT

## 2020-11-15 PROCEDURE — 89342 STORAGE/YEAR EMBRYO(S): CPT | Mod: NC

## 2020-11-15 PROCEDURE — 36415 COLL VENOUS BLD VENIPUNCTURE: CPT

## 2020-11-25 ENCOUNTER — APPOINTMENT (OUTPATIENT)
Dept: HUMAN REPRODUCTION | Facility: CLINIC | Age: 45
End: 2020-11-25
Payer: COMMERCIAL

## 2020-11-25 PROCEDURE — 36415 COLL VENOUS BLD VENIPUNCTURE: CPT

## 2020-12-01 ENCOUNTER — APPOINTMENT (OUTPATIENT)
Dept: HUMAN REPRODUCTION | Facility: CLINIC | Age: 45
End: 2020-12-01

## 2020-12-02 ENCOUNTER — NON-APPOINTMENT (OUTPATIENT)
Age: 45
End: 2020-12-02

## 2020-12-02 ENCOUNTER — APPOINTMENT (OUTPATIENT)
Dept: HUMAN REPRODUCTION | Facility: CLINIC | Age: 45
End: 2020-12-02

## 2020-12-03 ENCOUNTER — APPOINTMENT (OUTPATIENT)
Dept: HUMAN REPRODUCTION | Facility: CLINIC | Age: 45
End: 2020-12-03
Payer: COMMERCIAL

## 2020-12-03 PROCEDURE — 99072 ADDL SUPL MATRL&STAF TM PHE: CPT

## 2020-12-03 PROCEDURE — 36415 COLL VENOUS BLD VENIPUNCTURE: CPT

## 2020-12-09 ENCOUNTER — APPOINTMENT (OUTPATIENT)
Dept: HUMAN REPRODUCTION | Facility: CLINIC | Age: 45
End: 2020-12-09
Payer: COMMERCIAL

## 2020-12-09 PROCEDURE — 99072 ADDL SUPL MATRL&STAF TM PHE: CPT

## 2020-12-09 PROCEDURE — 76817 TRANSVAGINAL US OBSTETRIC: CPT

## 2020-12-09 PROCEDURE — 99213 OFFICE O/P EST LOW 20 MIN: CPT | Mod: 25

## 2021-02-25 PROBLEM — G43.109 OCULAR MIGRAINE: Status: RESOLVED | Noted: 2021-02-25 | Resolved: 2021-02-25

## 2021-03-01 ENCOUNTER — APPOINTMENT (OUTPATIENT)
Dept: BREAST CENTER | Facility: CLINIC | Age: 46
End: 2021-03-01
Payer: COMMERCIAL

## 2021-03-01 VITALS
SYSTOLIC BLOOD PRESSURE: 102 MMHG | HEART RATE: 85 BPM | DIASTOLIC BLOOD PRESSURE: 64 MMHG | WEIGHT: 135 LBS | BODY MASS INDEX: 21.69 KG/M2 | HEIGHT: 66 IN

## 2021-03-01 DIAGNOSIS — G43.109 MIGRAINE WITH AURA, NOT INTRACTABLE, W/OUT STATUS MIGRAINOSUS: ICD-10-CM

## 2021-03-01 PROCEDURE — 99072 ADDL SUPL MATRL&STAF TM PHE: CPT

## 2021-03-01 PROCEDURE — 99213 OFFICE O/P EST LOW 20 MIN: CPT

## 2021-03-01 RX ORDER — CHORIOGONADOTROPIN ALFA 250 UG/.5ML
250 INJECTION, SOLUTION SUBCUTANEOUS
Qty: 1 | Refills: 0 | Status: DISCONTINUED | COMMUNITY
Start: 2019-10-18 | End: 2021-03-01

## 2021-03-01 RX ORDER — LETROZOLE TABLETS 2.5 MG/1
2.5 TABLET, FILM COATED ORAL
Qty: 10 | Refills: 0 | Status: DISCONTINUED | COMMUNITY
Start: 2019-09-20 | End: 2021-03-01

## 2021-03-01 RX ORDER — LETROZOLE TABLETS 2.5 MG/1
2.5 TABLET, FILM COATED ORAL
Qty: 10 | Refills: 0 | Status: DISCONTINUED | COMMUNITY
Start: 2019-10-18 | End: 2021-03-01

## 2021-03-01 RX ORDER — CHORIOGONADOTROPIN ALFA 250 UG/.5ML
250 INJECTION, SOLUTION SUBCUTANEOUS
Qty: 1 | Refills: 0 | Status: DISCONTINUED | COMMUNITY
Start: 2019-08-20 | End: 2021-03-01

## 2021-03-01 RX ORDER — CLOMIPHENE CITRATE 50 MG/1
50 TABLET ORAL
Qty: 10 | Refills: 0 | Status: DISCONTINUED | COMMUNITY
Start: 2019-08-20 | End: 2021-03-01

## 2021-09-09 ENCOUNTER — APPOINTMENT (OUTPATIENT)
Dept: BREAST CENTER | Facility: CLINIC | Age: 46
End: 2021-09-09
Payer: COMMERCIAL

## 2021-09-09 ENCOUNTER — NON-APPOINTMENT (OUTPATIENT)
Age: 46
End: 2021-09-09

## 2021-09-09 VITALS
HEIGHT: 66 IN | SYSTOLIC BLOOD PRESSURE: 112 MMHG | DIASTOLIC BLOOD PRESSURE: 63 MMHG | HEART RATE: 81 BPM | WEIGHT: 135 LBS | BODY MASS INDEX: 21.69 KG/M2

## 2021-09-09 PROCEDURE — 99214 OFFICE O/P EST MOD 30 MIN: CPT

## 2022-02-22 ENCOUNTER — APPOINTMENT (OUTPATIENT)
Dept: HUMAN REPRODUCTION | Facility: CLINIC | Age: 47
End: 2022-02-22
Payer: COMMERCIAL

## 2022-02-22 PROCEDURE — 99214 OFFICE O/P EST MOD 30 MIN: CPT | Mod: 95

## 2022-03-09 ENCOUNTER — NON-APPOINTMENT (OUTPATIENT)
Age: 47
End: 2022-03-09

## 2022-03-16 ENCOUNTER — NON-APPOINTMENT (OUTPATIENT)
Age: 47
End: 2022-03-16

## 2022-03-16 ENCOUNTER — APPOINTMENT (OUTPATIENT)
Dept: BREAST CENTER | Facility: CLINIC | Age: 47
End: 2022-03-16

## 2022-03-30 ENCOUNTER — APPOINTMENT (OUTPATIENT)
Dept: HUMAN REPRODUCTION | Facility: CLINIC | Age: 47
End: 2022-03-30
Payer: COMMERCIAL

## 2022-03-30 PROCEDURE — 99213 OFFICE O/P EST LOW 20 MIN: CPT | Mod: 25

## 2022-03-30 PROCEDURE — 58340 CATHETER FOR HYSTEROGRAPHY: CPT

## 2022-03-30 PROCEDURE — 76831 ECHO EXAM UTERUS: CPT

## 2023-05-15 ENCOUNTER — APPOINTMENT (OUTPATIENT)
Dept: BREAST CENTER | Facility: CLINIC | Age: 48
End: 2023-05-15
Payer: COMMERCIAL

## 2023-05-15 VITALS
DIASTOLIC BLOOD PRESSURE: 74 MMHG | HEIGHT: 66 IN | HEART RATE: 67 BPM | SYSTOLIC BLOOD PRESSURE: 108 MMHG | WEIGHT: 123 LBS | BODY MASS INDEX: 19.77 KG/M2

## 2023-05-15 DIAGNOSIS — Z78.9 OTHER SPECIFIED HEALTH STATUS: ICD-10-CM

## 2023-05-15 PROCEDURE — 99024 POSTOP FOLLOW-UP VISIT: CPT

## 2023-05-15 NOTE — PAST MEDICAL HISTORY
[Menarche Age ____] : age at menarche was [unfilled] [Approximately ___] : the LMP was approximately [unfilled] [Total Preg ___] : G[unfilled] [Live Births ___] : P[unfilled]  [Abortions ___] : Abortions:[unfilled] [Age At Live Birth ___] : Age at live birth: [unfilled] [Menstruating] : The patient is menstruating [Definite ___ (Date)] : the last menstrual period was [unfilled] [Irregular Cycle Intervals] : are  irregular [History of Hormone Replacement Treatment] : has no history of hormone replacement treatment [FreeTextEntry2] : 2 miscarriages [FreeTextEntry6] : never [FreeTextEntry7] : yes - h/o Oral Contraceptive pill  [FreeTextEntry8] : yes for 8-9 months

## 2023-05-15 NOTE — HISTORY OF PRESENT ILLNESS
[FreeTextEntry1] : Patient is a 48yo F here for breast cancer screening. Patient with history of palpable right intramammary lymph node. Fhx of breast cancer in mother (age 39, DOD). Patient's sister is BRCA negative (no cancer). Patient is BRCA 1/2 negative (not full panel, tested 2018). Patient denies palpable masses, skin changes, or nipple discharge bilaterally.\par \par STEW Lifetime Risk- 27.1%\par iGap: enrolled 5/15/23 \par \par 3/2/19: B/L US- no evidence of malignancy, wnl. Rec screening MG.\par 5/30/19: B/L MG- extremely dense; R- no correlate to palpable concern in UOQ. L- questioned asymmetry in outer, does not clearly persist on additional views.\par 5/30/19: B/L US- R- 0.7cm intramammary LN corresponding to palpable concern; R axilla wnl. L (Targeted) - no mass or suspicious finding to correlate to MG; axilla wnl. \par 2/14/20: MRI- heterogeneously dense fibroglandular tissue w/ mild to moderate background parenchymal enhancement, R - segmental non-mass enhancement involving most of ther outer quadrant, additional areas of patchy/clumped superior medial non-mass enhancement, medial bx clip from prior benign MR bx, L - clumped non-mass outer enhancement, 0.3cm posterior lower enhancing focus, BIRADS 4 - MR bx rec for clumped nonmass enhancement\par 2/27/20: B/l MG & US- heterogeneously dense, R - tissue marker LIQ, US - R - 0.8cm cyst w/ thin septation 8:00 1FN, 0.5cm simple cyst 9:00 7FN, 0.7cm benign intramammary LN 10:00 7FN likely c/w palpable LN, 0.5cm simple cyst 10:00 8FN, 0.4cm simple cyst 11:00 2FN, 0.4cm simple RA cyst. BIRADS 2.\par 3/3/20: L MR bx x2- SITE 1) Posterior Lateral: "cylinder shaped clip" non-proliferative breast changes characterized by mild ductal hyperplasia, FA change, CCC, cystic and papillary apocrine metaplasia, dense stromal fibrosis, w/ focal PASH. SITE 2) Anterior Lateral: "bar bell shaped clip" fibroadenomatous nodule, non-proliferative breast changes characterized by FA change, CCC/CCH, cystic apocrine metaplasia, and dense stromal fibrosis w/ focal PASH\par 3/1/21: B/l US- R - 0.7cm complicated cyst. BIRADS 2. \par 10/4/21: B/l MG & US- extremely dense. US- R stable cluster of cyst 10:00. BI-RADS 2\par 4/4/22: MRI- No MRI evidence of malignancy. BI-RADS 2\par 1/13/23: B/l MG & US- extremely dense. SHARONDA. BI-RADS 1\par 5/15/23: MRI- heterogeneously dense, L-probably benign muriel areas of NME, likely background parenchymal enhancement, rec 6M f/u MRI. BIRADS 3.

## 2023-05-15 NOTE — PHYSICAL EXAM
[Normocephalic] : normocephalic [EOMI] : extra ocular movement intact [Supple] : supple [No Supraclavicular Adenopathy] : no supraclavicular adenopathy [No Cervical Adenopathy] : no cervical adenopathy [de-identified] : Bilateral Breast/Axilla/Supraclavicular Area: no masses, discharge, stable right axillary/intramammary lymph node.

## 2023-05-16 ENCOUNTER — APPOINTMENT (OUTPATIENT)
Dept: HEMATOLOGY ONCOLOGY | Facility: CLINIC | Age: 48
End: 2023-05-16

## 2023-05-17 NOTE — DISCUSSION/SUMMARY
[FreeTextEntry1] : The visit was provided via telehealth using real-time 2-way audio visual technology. The patient, Zahra Alonso was located at work in [place] at the time of the visit. The Genetic Counselor, Sudha Wood, was located at the medical office located in [place], [place]. The patient and the Genetic Counselor both participated in the telehealth encounter. Consent for telehealth services was given on 2023 by the patient, Zahra Alonso.\par \par REASON FOR CONSULT\par Zahra Alonso is a 47-year-old female who was referred by Dr. Jeannine Wilburn for cancer genetic counseling and risk assessment due to a family history of breast cancer. \par \par RELEVANT MEDICAL HISTORY\par Ms. 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 She was treated with excision. \par \par Of note, Ms. Alonso had genetic testing for the three Ashkenazi Orthodoxy  mutations in the BRCA1 and BRCA2 genes in 2018 ordered by Chepe Pond MD. Results were negative. Report was available for review at today’s appointment. \par \par OTHER MEDICAL AND SURGICAL HISTORY:\par •	Medical History: hypothyroidism, migraines\par •	Surgical History: SHANI excision, rhinoplasty (in her 20’s),  section (2017), LEEP procedure, patient also reports multiple D&Cs and removal of scar tissue in the uterus\par \par PAST OB/GYN HISTORY:\par Obstetrical History: \par Age at Menarche: 17\par Premenopausal \par Age at First Live Birth: 42\par Oral Contraceptive Use: Yes, approximately less than 1 year in total\par Hormone Replacement Therapy: No\par \par CANCER SCREENING HISTORY:  \par Breast: \par •	Mammography: last 10/2021, normal\par •	Sonography: last 10/2021, normal\par •	MRI: last 2022, normal\par •	Biopsies: 2020-Right, non-proliferative breast changes characterized by mild ductal hyperplasia, fibroadenomatous change, columnar cell change, cystic and papillary apocrine metaplasia, and dense stromal fibrosis with focal pseudoangiomatous stromal hyperplasia (PASH); 2019-R, nonproliferative breast changes without atypia characterized by stromal fibrosis, fibroadenomatoid change, and focal secretory change/pseudolactational hyperplasia\par GYN:\par •	Pelvic Examination: last 10/2022, reportedly normal \par •	Sonography: No\par •	CA-125: No\par Colon:\par •	Colonoscopy: last 2019 due to symptoms, reportedly normal. Patient reports that she was recommended to resume colonoscopies at the age recommended for the general population. This was the patient’s first colonoscopy. \par •	Upper Endoscopy: No\par •	FOBT: No\par Skin:  \par •	FBSE: last 2022, reportedly normal\par •	Lesions biopsied/removed: No\par \par SOCIAL HISTORY:\par •	\par •	Tobacco-product use: No\par •	Second-hand smoke exposure: No\par \par FAMILY HISTORY:\par Maternal ancestry was reported as [place]oc[place] and Ashkenazi Orthodoxy and paternal ancestry was reported as Slovenian, French, English, Eastern , and Western . Consanguinity was denied. A detailed family history of cancer was ascertained. Relevant diagnoses are detailed below and in the scanned pedigree. \par \par Of note, Ms. Alonso’s sister had genetic testing which was reportedly negative. Report was not available for review at today’s appointment. \par 	\par 	RISK ASSESSMENT:\par Ms. Alonso’s family history of breast cancer is suggestive of an inherited predisposition to breast cancer and related cancers given her mother’s breast cancer diagnosis at 39 years old in the setting of Ashkenazi Orthodoxy ancestry. We recommended genetic testing using the Breast Cancer and Gynecological Cancers Guidelines-Based panel offered at [facility]. This test analyzes \par This test analyzes 19 genes: MARIZOL, BARD1, BRCA1, BRCA2, BRIP1, CDH1, CHEK2, EPCAM, MLH1, MSH2, MSH6, NF1, PALB2, PMS2, PTEN, RAD51C, RAD51D, STK11, and TP53.\par \par We discussed the risks, benefits and limitations, and implications of genetic testing. We also discussed the psychosocial implications of genetic testing. Possible test results were reviewed with Ms. Alonso, along with associated medical management options. The Genetic Information Non-discrimination Act (BRITNI) was also reviewed. \par \par Ms. Alonso verbally consented to the above mentioned genetic testing panel. Informed consent form was emailed to the patient, and a saliva sample kit was given to the patient at her appointment with Dr. Rossy Wilburn on 05/15/2023. Once the sample has been collected and sent out, Ms. Alonso will inform us. \par \par PLAN\par 1.	Saliva kit was given to Ms. Alonso during her appointment with Dr. Rossy Wilburn on 05/15/2023 for collection. Once collected and dropped off, patient will inform us. \par 2.	Ms. Alonso was sent a copy of the informed consent via email to be filled, signed, and returned to us.\par 3.	We will contact Ms. Alonso once the results are available and will schedule a follow-up appointment, as needed. Results generally return in 2-3 weeks from the day the sample is received in the lab.\par \par For any additional questions please call Cancer Genetics at [phone]. \par \par Sudha Wood MS, [facility]\par Genetic Counselor, Cancer Genetics\par \par \par CC: \par Dr. Jeannine Wilburn \par

## 2023-06-22 ENCOUNTER — APPOINTMENT (OUTPATIENT)
Dept: HUMAN REPRODUCTION | Facility: CLINIC | Age: 48
End: 2023-06-22

## 2023-06-26 ENCOUNTER — NON-APPOINTMENT (OUTPATIENT)
Age: 48
End: 2023-06-26

## 2023-06-27 NOTE — DISCUSSION/SUMMARY
[FreeTextEntry1] : Patient was contacted multiple times to complete her genetic testing by sending her saliva kit to the testing company. This was never completed. A letter was sent today by our GCA, Deandre Hanna, requesting that she contact our office to indicate whether she would still like to pursue genetic testing, otherwise the order will be canceled in two weeks.\par \par Sudha Wood MS, [facility]\par Genetic Counselor, Cancer Genetics\par

## 2023-07-10 ENCOUNTER — NON-APPOINTMENT (OUTPATIENT)
Age: 48
End: 2023-07-10

## 2023-07-10 NOTE — DISCUSSION/SUMMARY
[FreeTextEntry1] : Patient did not respond to letter regarding submission of saliva sample for genetic testing ordered in May 2023. Genetic testing order was cancelled on 07/10/2023.\par \par Ms. Alonso may pursue testing at any time in the future and we remain available to them should they wish to make another appointment.\par \par Sudha Wood, MS, [facility]\par Genetic Counselor, Cancer Genetics\par

## 2023-07-17 ENCOUNTER — NON-APPOINTMENT (OUTPATIENT)
Age: 48
End: 2023-07-17

## 2023-07-17 NOTE — DISCUSSION/SUMMARY
[FreeTextEntry1] : Ms. Alonso recontacted Cancer Genetics on 07/17/2023 for genetic testing. She was initially seen for cancer genetic counseling and risk assessment on 05/16/2023. at which time, the benefits, risks, and limitations of germline testing were discussed. Ms. Alonso did not submit a saliva sample for testing and testing was cancelled on 07/10/2023.\par \par RISK ASSESSMENT:\par As per initial note, Ms. Alonso meets National Comprehensive Cancer Network (NCCN) criteria for genetic testing. We recommended genetic testing for genes associated with breast cancer and gynecological cancers. This test analyzes [19] genes: MARIZOL, BARD1, BRCA1, BRCA2, BRIP1, CDH1, CHEK2, EPCAM, MLH1, MSH2, MSH6, NF1, PALB2, PMS2, PTEN, RAD51C, RAD51D, STK11, and TP53.\par \par The option for genetic testing was reviewed today and Ms. Alonso verbally consented to pursue testing. [employer] will send a saliva sample kit to the patient’s home to submit a sample for genetic testing.\par \par PLAN:\par 1.)	A saliva kit was ordered today through [employer] and will be sent to Ms. Alonso’s home to submit a sample for genetic testing.\par 2.)	We will contact Ms. Alonso for a follow-up appointment once results are available. Results generally return in 2-3 weeks.\par \par For any additional questions please call Cancer Genetics at [phone].\par \par Sudha Wood MS, [facility]\par Genetic Counselor, Cancer Genetics\par

## 2023-10-16 ENCOUNTER — NON-APPOINTMENT (OUTPATIENT)
Age: 48
End: 2023-10-16

## 2023-10-30 ENCOUNTER — NON-APPOINTMENT (OUTPATIENT)
Age: 48
End: 2023-10-30

## 2023-11-01 ENCOUNTER — NON-APPOINTMENT (OUTPATIENT)
Age: 48
End: 2023-11-01

## 2023-12-14 ENCOUNTER — NON-APPOINTMENT (OUTPATIENT)
Age: 48
End: 2023-12-14

## 2023-12-15 NOTE — DISCUSSION/SUMMARY
[FreeTextEntry1] : Patient did not submit her saliva sample for genetic testing after numerous attempts. Genetic testing order was cancelled on 12/15/2023.   Sudha Wood MS, [facility] Genetic Counselor, Cancer Genetics

## 2024-01-08 ENCOUNTER — NON-APPOINTMENT (OUTPATIENT)
Age: 49
End: 2024-01-08

## 2024-03-20 PROBLEM — R92.8 ABNORMAL FINDING ON RADIOLOGICAL EXAMINATION OF BREAST: Status: ACTIVE | Noted: 2023-05-15

## 2024-03-20 PROBLEM — Z12.39 BREAST CANCER SCREENING: Status: ACTIVE | Noted: 2021-08-27

## 2024-03-25 ENCOUNTER — APPOINTMENT (OUTPATIENT)
Dept: BREAST CENTER | Facility: CLINIC | Age: 49
End: 2024-03-25
Payer: COMMERCIAL

## 2024-03-25 DIAGNOSIS — R92.8 OTHER ABNORMAL AND INCONCLUSIVE FINDINGS ON DIAGNOSTIC IMAGING OF BREAST: ICD-10-CM

## 2024-03-25 DIAGNOSIS — Z12.39 ENCOUNTER FOR OTHER SCREENING FOR MALIGNANT NEOPLASM OF BREAST: ICD-10-CM

## 2024-05-20 ENCOUNTER — NON-APPOINTMENT (OUTPATIENT)
Age: 49
End: 2024-05-20

## 2024-05-20 ENCOUNTER — APPOINTMENT (OUTPATIENT)
Dept: BREAST CENTER | Facility: CLINIC | Age: 49
End: 2024-05-20
Payer: COMMERCIAL

## 2024-05-20 VITALS
WEIGHT: 125 LBS | BODY MASS INDEX: 20.09 KG/M2 | HEIGHT: 66 IN | DIASTOLIC BLOOD PRESSURE: 74 MMHG | HEART RATE: 71 BPM | SYSTOLIC BLOOD PRESSURE: 118 MMHG

## 2024-05-20 DIAGNOSIS — Z80.3 FAMILY HISTORY OF MALIGNANT NEOPLASM OF BREAST: ICD-10-CM

## 2024-05-20 DIAGNOSIS — N64.9 DISORDER OF BREAST, UNSPECIFIED: ICD-10-CM

## 2024-05-20 DIAGNOSIS — Z12.39 ENCOUNTER FOR OTHER SCREENING FOR MALIGNANT NEOPLASM OF BREAST: ICD-10-CM

## 2024-05-20 PROCEDURE — 99214 OFFICE O/P EST MOD 30 MIN: CPT

## 2024-05-20 NOTE — HISTORY OF PRESENT ILLNESS
[FreeTextEntry1] : Patient is a 47yo F here for breast cancer screening. Patient with history of palpable right intramammary lymph node. Fhx of breast cancer in mother (age 39, DOD). Patient's sister is BRCA negative (no cancer). Patient is BRCA 1/2 negative (not full panel, tested 2018). Patient met with genetic counselor 5/2023 but did not submit saliva sample for testing. Patient denies palpable masses, skin changes, or nipple discharge bilaterally.  STEW Lifetime Risk- 27.1% iGap: enrolled 5/15/23   3/2/19: B/L US- no evidence of malignancy, wnl. Rec screening MG. 5/30/19: B/L MG- extremely dense; R- no correlate to palpable concern in UOQ. L- questioned asymmetry in outer, does not clearly persist on additional views. 5/30/19: B/L US- R- 0.7cm intramammary LN corresponding to palpable concern; R axilla wnl. L (Targeted) - no mass or suspicious finding to correlate to MG; axilla wnl. 2/14/20: MRI- heterogeneously dense fibroglandular tissue w/ mild to moderate background parenchymal enhancement, R - segmental non-mass enhancement involving most of ther outer quadrant, additional areas of patchy/clumped superior medial non-mass enhancement, medial bx clip from prior benign MR bx, L - clumped non-mass outer enhancement, 0.3cm posterior lower enhancing focus, BIRADS 4 - MR bx rec for clumped nonmass enhancement 2/27/20: B/l MG & US- heterogeneously dense, R - tissue marker LIQ, US - R - 0.8cm cyst w/ thin septation 8:00 1FN, 0.5cm simple cyst 9:00 7FN, 0.7cm benign intramammary LN 10:00 7FN likely c/w palpable LN, 0.5cm simple cyst 10:00 8FN, 0.4cm simple cyst 11:00 2FN, 0.4cm simple RA cyst. BIRADS 2. 3/3/20: L MR bx x2- SITE 1) Posterior Lateral: "cylinder shaped clip" non-proliferative breast changes characterized by mild ductal hyperplasia, FA change, CCC, cystic and papillary apocrine metaplasia, dense stromal fibrosis, w/ focal PASH. SITE 2) Anterior Lateral: "bar bell shaped clip" fibroadenomatous nodule, non-proliferative breast changes characterized by FA change, CCC/CCH, cystic apocrine metaplasia, and dense stromal fibrosis w/ focal PASH 3/1/21: B/l US- R - 0.7cm complicated cyst. BIRADS 2.  10/4/21: B/l MG & US- extremely dense. US- R stable cluster of cyst 10:00. BI-RADS 2 4/4/22: MRI- No MRI evidence of malignancy. BI-RADS 2 1/13/23: B/l MG & US- extremely dense. SHARONDA. BI-RADS 1 5/15/23: MRI- heterogeneously dense, L-probably benign muriel areas of NME, likely background parenchymal enhancement, rec 6M f/u MRI. BIRADS 3.  12/1/23: MRI- Dense. Negative. BIRADS 1.  3/25/24: B/l MG & US- extremely dense. B/l fibrocystic changes. BI-RADS 2

## 2024-05-20 NOTE — PHYSICAL EXAM
[de-identified] : Bilateral Breast/Axilla/Supraclavicular Area: no masses, discharge, stable right axillary/intramammary lymph node.

## 2024-05-20 NOTE — PAST MEDICAL HISTORY
[History of Hormone Replacement Treatment] : has no history of hormone replacement treatment [FreeTextEntry2] : 2 miscarriages [FreeTextEntry6] : never [FreeTextEntry7] : yes - h/o Oral Contraceptive pill  [FreeTextEntry8] : yes for 8-9 months

## 2024-05-20 NOTE — PLAN
[TextEntry] : Reviewed imaging findings and discussed results with patient. She is to get high risk MRI in September. She is to return in 1 year for annual B/l MG & US and office visit.  We rediscussed genetic testing.  Patient saw counselor to discuss updated testing however has not been performed.  Patient agrees to testing and we will draw blood today.

## 2024-05-20 NOTE — DISCUSSION/SUMMARY
[FreeTextEntry1] : Ms. Alonso returned today for genetic testing. She was initially seen for cancer genetic counseling and risk assessment on 05/16/2023 at which time, the benefits, risks, and limitations of germline testing were discussed, and patient consented to testing. However, patient did not submit saliva sample and testing was subsequently cancelled. Patient requested new kits multiple times once testing was cancelled and her order was last cancelled on 12/15/2023.  As per initial note, Ms. Alonso meets National Comprehensive Cancer Network (NCCN) criteria for genetic testing. We recommended genetic testing for genes associated with breast cancer and gynecological cancers. This test analyzes [19] genes: MARIZOL, BARD1, BRCA1, BRCA2, BRIP1, CDH1, CHEK2, EPCAM, MLH1, MSH2, MSH6, NF1, PALB2, PMS2, PTEN, RAD51C, RAD51D, STK11, and TP53. She denied any changes to her medical and family history since May 2023.   The option for genetic testing was reviewed today and Ms. Alonso consented to pursue testing. She was then taken to our laboratory for a blood draw and her sample was sent today to [facility].  PLAN: 1.)	Blood drawn today will be sent to [facility] for analysis. 2.)	We will contact Ms. Alonso for a follow-up appointment once results are available. Results generally return in 2-3 weeks.  For any additional questions please call Cancer Genetics at [phone].  Sudha Wood MS, [facility] Genetic Counselor, Cancer Genetics  CC: Dr. Jeannine Wilburn

## 2024-06-05 ENCOUNTER — NON-APPOINTMENT (OUTPATIENT)
Age: 49
End: 2024-06-05

## 2024-06-06 NOTE — DISCUSSION/SUMMARY
[FreeTextEntry1] : RESULTS TRANSMISSION Zahra Alonso is a 48-year-old female who was called on 06/05/2024 for a discussion regarding their genetic testing results related to hereditary cancer predisposition.   Ms. Alonso was originally seen at [facility] on 05/16/2023 for hereditary cancer predisposition risk assessment due to a family history of breast cancer. Ms. Alonso decided to pursue genetic testing using the BRCANext panel offered by [facility].  INTERVAL HISTORY She was initially seen for cancer genetic counseling and risk assessment on 05/16/2023 at which time, the benefits, risks, and limitations of germline testing were discussed, and patient consented to testing. However, patient did not submit saliva sample and testing was subsequently cancelled. Patient requested new kits multiple times once testing was cancelled and her order was last cancelled on 12/15/2023. She recontacted [facility] on 05/20/2024 to proceed with a blood draw to submit her sample for genetic testing.   TEST RESULTS: NEGATIVE  No pathogenic (disease-causing) variants or VUSs were detected in the following genes:  MARIZOL, BARD1, BRCA1, BRCA2, BRIP1, CDH1, CHEK2, EPCAM, MLH1, MSH2, MSH6, NF1, PALB2, PMS2, PTEN, RAD51C, RAD51D, STK11, and TP53.  RESULTS INTERPRETATION AND ASSESSMENT: Given Ms. Alonso's personal medical history and current reported family history of cancer, and her negative genetic test results, the following screening guidelines and risk-reducing recommendations were discussed:  BREAST:  - An STEW risk evaluation, v8 was conducted utilizing family history and reproductive factors.  An updated 4.2-5.6% 10-year risk and 17.8-23% remaining lifetime risk of breast cancer was quoted compared to the general population risk of 2.3% and 10.1%, respectively. As per National Comprehensive Cancer Network (NCCN) guidelines, women with a remaining lifetime breast cancer risk >20% as calculated by models such as STEW may consider increased-risk breast screening. This includes annual mammograms and annual breast MRIs starting at age 40 (or 10 years prior to the youngest age at breast cancer diagnosis in the family, which would be age 29 for Ms. Alonso). This recommendation is concordant with the recommendations from her breast care team.  OTHER:  - In the absence of other indications, Ms. Alonso should practice age-appropriate cancer screening of other organ systems as recommended for the general population.  We also discussed that, while no cause of the patient's personal and family history of cancer was identified, this result, while reassuring, does entirely not rule out a hereditary cancer risk in the patient. It is possible, although unlikely, the patient has a mutation in one of the genes tested that is not detectable by this analysis, or has a mutation in a different gene, either known or unknown. It is also possible there is a hereditary cancer predisposition in the family, but the patient did not inherit it.  We informed Ms. Alonso that our knowledge of genetics and inherited cancer conditions is changing rapidly. Therefore, we recommended that Ms. Alonso contact our office, every 2 to 3 years, to discuss relevant advances in cancer genetics.  We emphasized the importance of re-contacting us with updates regarding her personal and family history of cancer as well as any updates regarding additional cancer genetic test results performed for the patient and/or family members.  Such updates could possibly change our risk assessment and recommendations.   In addition, we discussed Ms. Alonso's sisters as well as maternal extended relatives could consider pursuing cancer risk assessment genetic counseling with the option of genetic testing.   PLAN: 1.See above for recommended screening and risk-reduction strategies. 2. Patient informed consult note(s) will be available through their [facility] portal and genetic test results will be released via [facility]'s laboratory portal.  3. Ms. Alonso was encouraged to contact us every 2-3 years to discuss relevant advances in cancer genetics, or sooner if there are any changes in her personal or family history of cancer.   For any additional questions please call [facility] at [phone].    Sudha Wood MS, [facility] Genetic Counselor, [facility]   CC:  Patient Dr. Jeannine Wilburn

## 2025-02-10 ENCOUNTER — NON-APPOINTMENT (OUTPATIENT)
Age: 50
End: 2025-02-10

## 2025-05-23 PROBLEM — R92.30 DENSE BREASTS: Status: ACTIVE | Noted: 2025-05-23

## 2025-05-28 ENCOUNTER — APPOINTMENT (OUTPATIENT)
Dept: BREAST CENTER | Facility: CLINIC | Age: 50
End: 2025-05-28
Payer: COMMERCIAL

## 2025-05-28 ENCOUNTER — NON-APPOINTMENT (OUTPATIENT)
Age: 50
End: 2025-05-28

## 2025-05-28 VITALS
WEIGHT: 121 LBS | HEART RATE: 76 BPM | HEIGHT: 65 IN | DIASTOLIC BLOOD PRESSURE: 71 MMHG | BODY MASS INDEX: 20.16 KG/M2 | SYSTOLIC BLOOD PRESSURE: 114 MMHG

## 2025-05-28 DIAGNOSIS — Z12.39 ENCOUNTER FOR OTHER SCREENING FOR MALIGNANT NEOPLASM OF BREAST: ICD-10-CM

## 2025-05-28 DIAGNOSIS — R92.30 DENSE BREASTS, UNSPECIFIED: ICD-10-CM

## 2025-05-28 PROCEDURE — 99213 OFFICE O/P EST LOW 20 MIN: CPT

## 2025-09-16 ENCOUNTER — APPOINTMENT (OUTPATIENT)
Dept: HUMAN REPRODUCTION | Facility: CLINIC | Age: 50
End: 2025-09-16
Payer: COMMERCIAL

## 2025-09-16 PROCEDURE — 99213 OFFICE O/P EST LOW 20 MIN: CPT | Mod: 95
